# Patient Record
Sex: MALE | Race: WHITE | Employment: FULL TIME
[De-identification: names, ages, dates, MRNs, and addresses within clinical notes are randomized per-mention and may not be internally consistent; named-entity substitution may affect disease eponyms.]

---

## 2023-09-17 ENCOUNTER — HOSPITAL ENCOUNTER (EMERGENCY)
Facility: HOSPITAL | Age: 38
Discharge: HOME | End: 2023-09-17
Attending: STUDENT IN AN ORGANIZED HEALTH CARE EDUCATION/TRAINING PROGRAM
Payer: COMMERCIAL

## 2023-09-17 VITALS
TEMPERATURE: 97.8 F | RESPIRATION RATE: 18 BRPM | WEIGHT: 185 LBS | HEIGHT: 70 IN | SYSTOLIC BLOOD PRESSURE: 157 MMHG | BODY MASS INDEX: 26.48 KG/M2 | HEART RATE: 70 BPM | DIASTOLIC BLOOD PRESSURE: 92 MMHG | OXYGEN SATURATION: 99 %

## 2023-09-17 DIAGNOSIS — L08.9 LOCAL INFECTION OF SKIN AND SUBCUTANEOUS TISSUE: Primary | ICD-10-CM

## 2023-09-17 PROCEDURE — 99283 EMERGENCY DEPT VISIT LOW MDM: CPT

## 2023-09-17 PROCEDURE — 6370000000 HC RX 637 (ALT 250 FOR IP): Performed by: EMERGENCY MEDICINE

## 2023-09-17 RX ORDER — IBUPROFEN 600 MG/1
600 TABLET ORAL EVERY 6 HOURS PRN
Qty: 20 TABLET | Refills: 0 | Status: SHIPPED | OUTPATIENT
Start: 2023-09-17

## 2023-09-17 RX ORDER — IBUPROFEN 600 MG/1
600 TABLET ORAL
Status: COMPLETED | OUTPATIENT
Start: 2023-09-17 | End: 2023-09-17

## 2023-09-17 RX ADMIN — IBUPROFEN 600 MG: 600 TABLET, FILM COATED ORAL at 09:44

## 2023-09-17 ASSESSMENT — PAIN SCALES - GENERAL
PAINLEVEL_OUTOF10: 7
PAINLEVEL_OUTOF10: 6

## 2023-09-17 ASSESSMENT — ENCOUNTER SYMPTOMS
BACK PAIN: 0
COUGH: 0
SHORTNESS OF BREATH: 0
TROUBLE SWALLOWING: 0
COLOR CHANGE: 0
BLOOD IN STOOL: 0
ABDOMINAL PAIN: 0

## 2023-09-17 ASSESSMENT — PAIN DESCRIPTION - ORIENTATION: ORIENTATION: MID

## 2023-09-17 ASSESSMENT — PAIN DESCRIPTION - LOCATION
LOCATION: RECTUM
LOCATION: BUTTOCKS

## 2023-09-17 ASSESSMENT — PAIN DESCRIPTION - DESCRIPTORS
DESCRIPTORS: ACHING
DESCRIPTORS: ACHING

## 2023-09-17 ASSESSMENT — PAIN - FUNCTIONAL ASSESSMENT: PAIN_FUNCTIONAL_ASSESSMENT: 0-10

## 2023-09-17 NOTE — ED NOTES
DC paperwork reviewed, pt verbalized understanding, Pt ambulatory at time of DC, no distress noted.       [place] LPN  39/[id number]/04 [id number]

## 2023-09-17 NOTE — DISCHARGE INSTRUCTIONS
APPLY WARM COMPRESS OR soak in warm water and 1 CUP baking soda  (PER TUB BATH) 2-3 times daily  FOLLOW UP IF ANY INCREASE IN REDNESS OR STREAKING OF REDNESS AS DISCUSSED. FINISH ANTIBIOTICS AS PRESCRIBED. RECOMMEND USING AN AIR DONUT TO AVOID FULL PRESSURE ON THE AREA WHEN SITTING.

## 2023-09-17 NOTE — ED TRIAGE NOTES
Patient arrives with c/o [place]-anal/rectal pain and concern for abscess. States was prescribed doxycyline last week at Urgent Care. Reports intermittent chills. Denies fever, N/V/D. Adis Munoz NP in triage assessing patient.

## 2023-09-17 NOTE — ED PROVIDER NOTES
[place]ona Dooley      [facility] EMERGENCY DEPT  EMERGENCY DEPARTMENT ENCOUNTER      Pt Name: Mercedes Current  MRN: [id number]  [address] 1985  Date of evaluation: 9/17/2023  Provider: ADDISON Hutchison Mai, NP    CHIEF COMPLAINT       Chief Complaint   Patient presents with    Abscess         HISTORY OF PRESENT ILLNESS   (Location/Symptom, Timing/Onset, Context/Setting, Quality, Duration, Modifying Factors, Severity)  Note limiting factors. HPI  Patient is a 82-year-old male with past medical history significant for previous skin abscess who presents to the ED with a tender area on his left buttocks for approximately 6 days. He was evaluated in urgent care on Friday and started on oral doxycycline and topical mupirocin ointment. He states he continues to have pain with sitting. He has had intermittent chills but denies fever. He denies any drainage or bleeding. He had a bowel movement this morning without any difficulty. He has not had any pain medication prior to arrival.  He took some Aleve yesterday with some relief noted. Review of External Medical Records:     Nursing Notes were reviewed. REVIEW OF SYSTEMS    (2-9 systems for level 4, 10 or more for level 5)     Review of Systems   Constitutional:  Negative for activity change, appetite change, fever and unexpected weight change. HENT:  Negative for congestion and trouble swallowing. Respiratory:  Negative for cough and shortness of breath. Cardiovascular:  Negative for chest pain, palpitations and leg swelling. Gastrointestinal:  Negative for abdominal pain and blood in stool. Musculoskeletal:  Negative for back pain. Skin:  Negative for color change. Localized tender area on left buttocks; without fluctuance; without extending erythema, drainage or bleeding   All other systems reviewed and are negative. Except as noted above the remainder of the review of systems was reviewed and negative.        PAST MEDICAL HISTORY   No past medical history